# Patient Record
Sex: FEMALE | Race: BLACK OR AFRICAN AMERICAN | URBAN - METROPOLITAN AREA
[De-identification: names, ages, dates, MRNs, and addresses within clinical notes are randomized per-mention and may not be internally consistent; named-entity substitution may affect disease eponyms.]

---

## 2024-11-12 ENCOUNTER — OFFICE VISIT (OUTPATIENT)
Dept: URBAN - METROPOLITAN AREA CLINIC 12 | Facility: CLINIC | Age: 54
End: 2024-11-12

## 2024-11-12 VITALS
HEIGHT: 68 IN | DIASTOLIC BLOOD PRESSURE: 75 MMHG | BODY MASS INDEX: 30.28 KG/M2 | HEART RATE: 76 BPM | TEMPERATURE: 97.3 F | WEIGHT: 199.8 LBS | SYSTOLIC BLOOD PRESSURE: 114 MMHG

## 2024-11-12 DIAGNOSIS — K21.9 CHRONIC GERD: ICD-10-CM

## 2024-11-12 DIAGNOSIS — Z86.0101 PERSONAL HISTORY OF ADENOMATOUS AND SERRATED COLON POLYPS: ICD-10-CM

## 2024-11-12 PROCEDURE — 99204 OFFICE O/P NEW MOD 45 MIN: CPT | Performed by: INTERNAL MEDICINE

## 2024-11-12 NOTE — HPI-TODAY'S VISIT:
55 yo female here to schedule egd colon . CT abd without contrast on 9/13/22--hepatic lesion 3.1x 2.3cm on left lobe and subcentimeter hepatic cysts scattered. colonoscopy in 2019--13mm SSA polyp.  Colon 2022--5mm benign polyp EGD 2022--benign gastric polyp . Denies FH of GI malignancy.  she had chr GERD She wants to schedule egd colonoscopy. She says she is afraid of getting GI cancers BM everyday. No blood C/o incomplete evacuation